# Patient Record
Sex: MALE | Race: WHITE
[De-identification: names, ages, dates, MRNs, and addresses within clinical notes are randomized per-mention and may not be internally consistent; named-entity substitution may affect disease eponyms.]

---

## 2019-09-13 ENCOUNTER — HOSPITAL ENCOUNTER (OUTPATIENT)
Dept: HOSPITAL 97 - OR | Age: 84
Discharge: HOME | End: 2019-09-13
Attending: OTOLARYNGOLOGY
Payer: COMMERCIAL

## 2019-09-13 VITALS — TEMPERATURE: 98.5 F | SYSTOLIC BLOOD PRESSURE: 129 MMHG | DIASTOLIC BLOOD PRESSURE: 70 MMHG

## 2019-09-13 VITALS — OXYGEN SATURATION: 95 %

## 2019-09-13 DIAGNOSIS — C44.329: Primary | ICD-10-CM

## 2019-09-13 DIAGNOSIS — Z85.46: ICD-10-CM

## 2019-09-13 DIAGNOSIS — I10: ICD-10-CM

## 2019-09-13 DIAGNOSIS — Z90.49: ICD-10-CM

## 2019-09-13 DIAGNOSIS — E07.9: ICD-10-CM

## 2019-09-13 DIAGNOSIS — Z82.3: ICD-10-CM

## 2019-09-13 PROCEDURE — 0HB1XZZ EXCISION OF FACE SKIN, EXTERNAL APPROACH: ICD-10-PCS

## 2019-09-13 PROCEDURE — 93005 ELECTROCARDIOGRAM TRACING: CPT

## 2019-09-13 PROCEDURE — 88331 PATH CONSLTJ SURG 1 BLK 1SPC: CPT

## 2019-09-13 PROCEDURE — 88332 PATH CONSLTJ SURG EA ADD BLK: CPT

## 2019-09-13 PROCEDURE — 0HQ1XZZ REPAIR FACE SKIN, EXTERNAL APPROACH: ICD-10-PCS

## 2019-09-13 PROCEDURE — 88305 TISSUE EXAM BY PATHOLOGIST: CPT

## 2019-09-13 PROCEDURE — 11644 EXC F/E/E/N/L MAL+MRG 3.1-4: CPT

## 2019-09-13 PROCEDURE — 12052 INTMD RPR FACE/MM 2.6-5.0 CM: CPT

## 2019-09-13 NOTE — P.BOP
Preoperative diagnosis: basosquamous carcinoma, R cheek


Postoperative diagnosis: same


Primary procedure: excision w/ FS and layered closure


Assistant: NONE,NONE


Estimated blood loss: <5ml


Specimen: R cheek, suture 12 oclock


Findings: negative margins.  Defect 32 x 21 mm


Anesthesia: General


Complications: None


Implants: none


Fluids & blood products: crystalloid 650ml


Transferred to: Recovery Room


Condition: Good

## 2019-09-13 NOTE — XMS REPORT
Encounter Summary

 Created on:2019



Patient:Latoya Glover

Sex:Male

:1933

External Reference #:[id number]





Demographics







 Address  [address] [place] [address]

 

 Home Phone  [phone]

 

 Preferred Language  English

 

 Marital Status  

 

 Presybeterian Affiliation  Unknown

 

 Race  White

 

 Ethnic Group  Not  or 









Author







Care Team Providers







 Name  Role  Phone

 

 Sanchez Pineda MD  Primary Care Provider  [phone]









Reason for Visit







 Follow Up Visit







Instructions







         1. Hypercholesterolemia

 

              lipid panel, serum

 

         2. Memory impairment

 

         3. Hypothyroidism

 

              TSH, serum or plasma

 

         4. Benign hypertension

 

              CMP, serum or plasma

 

              CBC w/ auto diff

 

         5. Chest pain

 

              chest pain: care instructions

 

              cardiology referral



Discussion Note: None recorded.



Plan of Care







 Reminders      Provider



       

 

      Appointments            None recorded.              



       

 

      Lab            Lipid Panel,         [place]



   Serum  2019  [facility] (Lab)

 

                  TSH, Serum or         [place]



   Plasma  2019  [facility] (Lab)

 

                  CMP, Serum or         [place]



   Plasma  2019  [facility] (Lab)

 

                  CBC W/ Auto         [place]



   Diff  2019  [facility] (Lab)

 

      Referral            Cardiology         Ulises Bob MD



   Referral  2019  



       

 

      Procedures            None recorded.              



       

 

      Surgeries            None recorded.              



       

 

      Imaging            None recorded.              



       







Medications







 Name  Start Date  









 allopurinol 300 mg tablet  



  TAKE 1 TABLET BY MOUTH ONCE DAILY  

 

 amlodipine 5 mg tablet  



  TAKE 1 TABLET(S) EVERY DAY BY ORAL ROUTE.  

 

 Aricept 10 mg tablet  



  Take 1 tablet every day by oral route.  

 

 B-12  



  1000 mcg  

 

 levothyroxine 75 mcg tablet  



  TAKE 1 TABLET BY MOUTH ONCE DAILY  

 

 lisinopril 20 mg-hydrochlorothiazide 25 mg tablet  



  TAKE 1 TABLET BY MOUTH EVERY DAY  

 

 meloxicam 15 mg tablet  



  TAKE 1 TABLET BY MOUTH ONCE DAILY  

 

 memantine 10 mg tablet  



  Take 1 tablet every day by oral route.  



  take twice daily for memory  

 

 mupirocin 2 % topical cream  



  APPLY A SMALL AMOUNT TO THE AFFECTED AREA BY TOPICAL ROUTE 3 TIMES PER DAY 
FOR 10 DAYS  

 

 niacin 500 mg tablet  2018



  Take 1 tablet every day by oral route.  

 

 Tylenol-Codeine #3 300 mg-30 mg tablet  



  Take 1 tablet 3 times a day by oral route.  

 

 Viagra 25 mg tablet  



  Take 1 tablet every day by oral route.  







Medications Administered

 None recorded.



Vitals







 Height  Weight  BMI  Blood Pressure

 

  66 in   211 lbs 16 oz   34.2 kg/m2   142/83 mm[Hg]







Lab Results

None recorded.



Allergies







 Code  Code System  Name  Reaction  Severity  Status  Onset



             









 NKDA        







Problems







 Name  Status  Onset Date  Source  



         









 Influenza Vaccination  Active  01/10/2018  

 

 Strain of Neck Muscle  Active  2018  

 

 Neck Pain  Active  2018  

 

 Chronic Neck Pain  Active  2018  

 

 Systolic Murmur  Active  2018  

 

 Vascular Dementia  Active  2018  

 

 Chest Pain  Active  2019  

 

 Hypothyroidism  Active    Encounter

 

 Hypercholesterolemia  Active    Encounter

 

 Gout  Active    Encounter

 

 Impotence  Active    Encounter

 

 Benign Hypertension  Active    Encounter

 

 Dental Caries  Active    Encounter

 

 Dental Abscess  Active    Encounter

 

 Osteoarthritis of Knee  Active    Encounter

 

 Dizziness Present  Active    Encounter

 

 Memory Impairment  Active    Encounter

 

 Unexplained Weight Loss  Active    Encounter

 

 Serum Creatinine Raised  Active    Encounter







Procedures







 Date  Name  Performed by  



       









   Appendectomy  Information not available

 

   Cholecystectomy  Information not available

 

   Tonsillectomy  Information not available







Vaccine List







 Vaccine Type

 

 influenza, high dose seasonal

 

     20170.5 mL

 

     01/10/80130.5 mL

 

     20180.5 mL

 

 pneumococcal conjugate PCV 13

 

     20160.5 mL







Social History







 Smoking Status  Former Smoker  







Past Encounters







 2019



 Hypercholesterolemia; Memory Impairment; Hypothyroidism; Benign Hypertension; 
Chest Pain



 Sanchez Pineda MD: [address] [place] [address], [address] [address], Ph. [phone]
[phone]

 

 2018



 Abrasion And/or Friction Burn of Skin; Memory Impairment; Vascular Dementia



 Sanchez Pineda MD: [address] [place] [address], [address], [place] [address], Ph. [phone]
[phone]







History of Present Illness

Note:    CC htnb&lt;div&gt;hpi doing well&lt;/div&gt;&lt;div&gt;CC dementia&lt;/
div&gt;&lt;div&gt;hpi doing better now mind seems clearer, driving around town&
lt;/div&gt;&lt;div&gt;CC CP &lt;/div&gt;&lt;div&gt;hpi had 3 or 4 episodes of 
CP when he went to bed stated was like pressure on the left side took 
ibuprophen and relieved pain&lt;/div&gt;&lt;div&gt;ros&lt;/div&gt;&lt;div&gt;
gen doing well&lt;/div&gt;&lt;div&gt;cv above&lt;/div&gt;&lt;div&gt;resp neg&lt;
/div&gt;&lt;div&gt;gi neg&lt;/div&gt;Review of Systems:    ROS as noted in the 
HPI



Review of Systems

None recorded.



Physical Exam







   Dr. Pineda Brief Adult Exam - M/F

 

 Reported By:  Patient

 

 Constitutional:  General Appearance: well-developed, obese. Level of Distress: 
NAD.



   Ambulation: ambulating normally

 

 Lungs:  Auscultation: breath sounds normal, good air movement, CTA except as



   noted, no wheezing, no rales/crackles, no rhonchi

 

 Cardiovascular:  Heart Auscultation: RRR, murmur, LISBET. Neck vessels: no 
carotid bruits

## 2019-09-13 NOTE — XMS REPORT
Encounter Summary

 Created on:2018



Patient:Latoya Glover

Sex:Male

:1933

External Reference #:[id number]





Demographics







 Address  [address] [place] [address]

 

 Home Phone  [phone]

 

 Preferred Language  English

 

 Marital Status  

 

 Sikh Affiliation  Unknown

 

 Race  White

 

 Ethnic Group  Not  or 









Author







Care Team Providers







 Name  Role  Phone

 

 Sanchez Pineda MD  Primary Care Provider  [phone]









Reason for Visit







 Follow Up Visit







Instructions







         1. Abrasion and/or friction burn of skin

 

         2. Memory impairment

 

         3. Vascular dementia



Discussion Note: None recorded.Patient educational handouts: No information 
available.



Plan of Care







 Reminders      Provider



       

 

      Appointments            Follow up       2019       Sanchez Pineda MD



     9:15AM  

 

      Lab            None              



   recorded.    

 

      Referral            None              



   recorded.    

 

      Procedures            None              



   recorded.    

 

      Surgeries            None              



   recorded.    

 

      Imaging            None              



   recorded.    







Medications







 Name  Start Date  









 allopurinol 300 mg tablet  



  TAKE 1 TABLET BY MOUTH ONCE DAILY  

 

 amlodipine 5 mg tablet  



  TAKE 1 TABLET(S) EVERY DAY BY ORAL ROUTE.  

 

 Aricept 10 mg tablet  



  Take 1 tablet every day by oral route.  

 

 B-12  



  1000 mcg  

 

 levothyroxine 75 mcg tablet  



  TAKE 1 TABLET BY MOUTH ONCE DAILY  

 

 lisinopril 20 mg-hydrochlorothiazide 25 mg tablet  



  TAKE 1 TABLET BY MOUTH EVERY DAY  

 

 meloxicam 15 mg tablet  



  TAKE 1 TABLET BY MOUTH ONCE DAILY  

 

 memantine 10 mg tablet  



  Take 1 tablet every day by oral route.  



  take twice daily for memory  

 

 mupirocin 2 % topical cream  



  APPLY A SMALL AMOUNT TO THE AFFECTED AREA BY TOPICAL ROUTE 3 TIMES PER DAY 
FOR 10 DAYS  

 

 niacin 500 mg tablet  2018



  Take 1 tablet every day by oral route.  

 

 Tylenol-Codeine #3 300 mg-30 mg tablet  



  Take 1 tablet 3 times a day by oral route.  

 

 Viagra 25 mg tablet  



  Take 1 tablet every day by oral route.  







Medications Administered

 None recorded.



Vitals







 Height  Weight  BMI  Blood Pressure

 

  66 in   209 lbs   33.7 kg/m2   151/80 mm[Hg]







Lab Results

None recorded.



Allergies







 Code  Code System  Name  Reaction  Severity  Status  Onset



             









 NKDA        







Problems







 Name  Status  Onset Date  Source  



         









 Influenza Vaccination  Active  01/10/2018  

 

 Strain of Neck Muscle  Active  2018  

 

 Neck Pain  Active  2018  

 

 Chronic Neck Pain  Active  2018  

 

 Systolic Murmur  Active  2018  

 

 Vascular Dementia  Active  2018  

 

 Hypothyroidism  Active    Encounter

 

 Hypercholesterolemia  Active    Encounter

 

 Gout  Active    Encounter

 

 Impotence  Active    Encounter

 

 Benign Hypertension  Active    Encounter

 

 Dental Caries  Active    Encounter

 

 Dental Abscess  Active    Encounter

 

 Osteoarthritis of Knee  Active    Encounter

 

 Dizziness Present  Active    Encounter

 

 Memory Impairment  Active    Encounter

 

 Unexplained Weight Loss  Active    Encounter

 

 Serum Creatinine Raised  Active    Encounter







Procedures







 Date  Name  Performed by  



       









   Appendectomy  Information not available

 

   Cholecystectomy  Information not available

 

   Tonsillectomy  Information not available







Vaccine List







 Vaccine Type

 

 influenza, high dose seasonal

 

     20170.5 mL

 

     01/10/59725.5 mL

 

     20180.5 mL

 

 pneumococcal conjugate PCV 13

 

     20160.5 mL







Social History







 Smoking Status  Former Smoker  







Past Encounters







 2018



 Abrasion And/or Friction Burn of Skin; Memory Impairment; Vascular Dementia



 Sanchez Pineda MD: [address] [place] [address], [address], [place] [address], Ph. [phone]
[phone]







History of Present Illness

Note:    CC f/up to fall&lt;div&gt;hpi pt feel a wk ago in the shower, abrasion 
to the rt forearm&lt;/div&gt;&lt;div&gt;cc memory loss&lt;/div&gt;&lt;div&gt;
hpi cont to worsen not taking meds&lt;/div&gt;&lt;div&gt;ros&lt;/div&gt;&lt;div&
gt;gen wife states driving in town &lt;/div&gt;&lt;div&gt;cv bp up dueto not 
taking meds&lt;/div&gt;&lt;div&gt;m/s neg&lt;/div&gt;Review of Systems:    ROS 
as noted in the HPI



Review of Systems

None recorded.



Physical Exam







   Dr. Pineda Brief Adult Exam - M/F

 

 Reported By:  Patient

 

 Constitutional:  General Appearance: well-developed, obese. Level of Distress: 
NAD.



   Ambulation: limited ambulation

 

 Lungs:  Auscultation: breath sounds normal, good air movement, CTA except as



   noted, no wheezing, no rales/crackles, no rhonchi

 

 Cardiovascular:  Heart Auscultation: RRR, no gallops

 

 Skin:  Inspection and palpation: ; healing abrasion to rt forearm

## 2019-09-13 NOTE — XMS REPORT
Clinical Summary

 Created on:2019



Patient:Latoya Glover

Sex:Male

:1933

External Reference #:[id number]





Demographics







 Address  [address]



   [place] [address]

 

 Home Phone  [phone]

 

 Email Address  [email or web address]

 

 Email Address  [email or web address]

 

 Preferred Language  English

 

 Marital Status  

 

 Christian Affiliation  Unknown

 

 Race  White

 

 Ethnic Group  Not  or 









Author







 Organization  [facility]

 

 Address  [address] [place] [address]









Support







 Name  Relationship  Address  Phone

 

 Amelia Glover  Spouse  Unavailable  [phone]









Care Team Providers







 Name  Role  Phone

 

 Sanchez Pineda MD  Primary Care Provider  [phone]









Allergies

No Known Allergies



Medications







 Medication  Sig  Dispensed  Refills  Start Date  End Date  Status

 

 allopurinol (ZYLOPRIM)  Take 300 mg by    1  2018    Active



 300 MG tablet  mouth daily.          

 

 lisinopril-hydrochloroth  Take 1 tablet by    3  2018    Active



 iazide  mouth daily.          



 (PRINZIDE,ZESTORETIC)            



 20-25 mg per tablet            

 

 amLODIPine (NORVASC) 5  TAKE 1 TABLET(S)    2  2018    Active



 mg tablet  EVERY DAY BY          



   ORAL ROUTE.          

 

 levothyroxine  Take 75 mcg by    0      Active



 (SYNTHROID, LEVOXYL) 75  mouth every          



 mcg tablet  morning.          

 

 docosahexanoic acid/epa  Take by mouth.    0      Active



 (FISH OIL ORAL)            

 

 gluc orozco/omega-3/vitamin  Take by mouth.    0      Active



 E (GLUCOSAMINE-FISH OIL            



 ORAL)            

 

 niacin 500 MG tablet  Take 500 mg by    0      Active



   mouth daily with          



   breakfast.          

 

 donepezil (ARICEPT) 10  Take 10 mg by    0      Active



 MG tablet  mouth nightly.          









 Hospital, Clinic, or Other  Ordered Dose  Route  Frequency  Start Date  End 
Date  Status



 Facility Administered            



 Medication            

 

 aspirin (ECOTRIN) enteric  81 mg  oral  daily  2018    Active



 coated tablet 81            



 mgIndications: Cerebral            



 infarction, unspecified            



 mechanism (HCC)            







Active Problems







 Problem  Noted Date

 

 Mild cognitive impairment  2018

 

 Memory loss  2018

 

 Cerebral infarct  2018

 

 Cervicalgia  2018







Family History







 Relation  Name  Status  Comments

 

 Father      

 

 Mother      







Social History







 Tobacco Use  Types  Packs/Day  Years Used  Date

 

 Never Smoker        









 Smokeless Tobacco: Never Used      









 Alcohol Use  Drinks/Week  oz/Week  Comments

 

 No      









 Sex Assigned at Birth  Date Recorded

 

 Not on file  









 Job Start Date  Occupation  Industry

 

 Not on file  Not on file  Not on file









 Travel History  Travel Start  Travel End









 No recent travel history available.







Last Filed Vital Signs

Not on file



Plan of Treatment







 Health Maintenance  Due Date  Last Done  Comments

 

 SHINGLES VACCINES (#1)  12/15/1983    

 

 65+ PNEUMOCOCCAL VACCINE (1 of 2 - PCV13)  12/15/1998    

 

 INFLUENZA VACCINE  2019    







Results

Not on fileafter 2018



Insurance







 Payer  Benefit Plan /  Subscriber ID  Effective Dates  Phone  Address  Type



   Group          

 

 MEDICARE  [place]  xxxxxxxx[place]  
[place]



   AND B    t      

 

 AARP  AARP SUPPLEMENT  xxxxxxxxxx  2008-Present      Commercial









 Guarantor Name  Account Type  Relation to  Date of  Phone  Billing



     Patient  Birth    Address

 

 Latoya Glover SWETHA  Personal/Family  Self  1933  [phone]   [address]







         (Home)  [place]



           [address]







Advance Directives

For more information, please contact: [phone]





 Type  Date Recorded  Patient Representative  Explanation

 

 Advance Directives, Living Will and      



 Medical Power of

## 2019-09-13 NOTE — XMS REPORT
Encounter Summary

 Created on:April 10, 2019



Patient:Latoya Glover

Sex:Male

:1933

External Reference #:[id number]





Demographics







 Address  [address] [place] [address]

 

 Home Phone  [phone]

 

 Preferred Language  English

 

 Marital Status  

 

 Rastafari Affiliation  Unknown

 

 Race  White

 

 Ethnic Group  Not  or 









Author







Care Team Providers







 Name  Role  Phone

 

 Sanchez Pineda MD  Primary Care Provider  [phone]









Reason for Visit







 Follow Up Visit







Instructions







         1. Daytime somnolence

 

              sleep study referral

 

         2. Vascular dementia



Discussion Note: None recorded.Patient educational handouts: No information 
available.



Plan of Care







 Reminders      Provider



       

 

      Appointments            Return to       on or around       Sanchez Pineda MD



   Office  2019  

 

      Lab            None              



   recorded.    

 

      Referral            Sleep Study       2019       [facility]



       

 

      Procedures            None              



   recorded.    

 

      Surgeries            None              



   recorded.    

 

      Imaging            None              



   recorded.    







Medications







 Name  Start Date  









 allopurinol 300 mg tablet  



  TAKE 1 TABLET BY MOUTH ONCE DAILY  

 

 amlodipine 5 mg tablet  



  TAKE 1 TABLET(S) EVERY DAY BY ORAL ROUTE.  

 

 Aricept 10 mg tablet  



  Take 1 tablet every day by oral route.  

 

 B-12  



  1000 mcg  

 

 levothyroxine 75 mcg tablet  



  TAKE 1 TABLET BY MOUTH ONCE DAILY  

 

 lisinopril 20 mg-hydrochlorothiazide 25 mg tablet  



  TAKE 1 TABLET BY MOUTH EVERY DAY  

 

 meloxicam 15 mg tablet  



  TAKE 1 TABLET BY MOUTH ONCE DAILY  

 

 memantine 10 mg tablet  



  Take 1 tablet every day by oral route.  



  take twice daily for memory  

 

 mupirocin 2 % topical cream  



  APPLY A SMALL AMOUNT TO THE AFFECTED AREA BY TOPICAL ROUTE 3 TIMES PER DAY 
FOR 10 DAYS  

 

 niacin 500 mg tablet  2018



  Take 1 tablet every day by oral route.  

 

 Tylenol-Codeine #3 300 mg-30 mg tablet  



  Take 1 tablet 3 times a day by oral route.  

 

 Viagra 25 mg tablet  



  Take 1 tablet every day by oral route.  







Medications Administered

 None recorded.



Vitals







 Height  Weight  BMI  Blood Pressure

 

  66 in   210 lbs 16 oz   34.1 kg/m2   152/82 mm[Hg]







Lab Results

None recorded.



Allergies







 Code  Code System  Name  Reaction  Severity  Status  Onset



             









 NKDA        







Problems







 Name  Status  Onset Date  Source  



         









 Influenza Vaccination  Active  01/10/2018  

 

 Strain of Neck Muscle  Active  2018  

 

 Neck Pain  Active  2018  

 

 Chronic Neck Pain  Active  2018  

 

 Systolic Murmur  Active  2018  

 

 Vascular Dementia  Active  2018  

 

 Chest Pain  Active  2019  

 

 Daytime Somnolence  Active  04/10/2019  

 

 Hypothyroidism  Active    Encounter

 

 Hypercholesterolemia  Active    Encounter

 

 Gout  Active    Encounter

 

 Impotence  Active    Encounter

 

 Benign Hypertension  Active    Encounter

 

 Dental Caries  Active    Encounter

 

 Dental Abscess  Active    Encounter

 

 Osteoarthritis of Knee  Active    Encounter

 

 Dizziness Present  Active    Encounter

 

 Memory Impairment  Active    Encounter

 

 Unexplained Weight Loss  Active    Encounter

 

 Serum Creatinine Raised  Active    Encounter







Procedures







 Date  Name  Performed by  



       









   Appendectomy  Information not available

 

   Cholecystectomy  Information not available

 

   Tonsillectomy  Information not available







Vaccine List







 Vaccine Type

 

 influenza, high dose seasonal

 

     20170.5 mL

 

     01/10/85420.5 mL

 

     20180.5 mL

 

 pneumococcal conjugate PCV 13

 

     20160.5 mL







Social History







 Smoking Status  Former Smoker  







Past Encounters







 04/10/2019



 Daytime Somnolence; Vascular Dementia



 Sanchez Pineda MD: [address] [place] [address], [address], [place] [address], Ph. [phone]
[phone]







History of Present Illness

Note:    CC somnolence&lt;div&gt;hpi wife states sleeping most of the time 
during the day he states does not sleep at night&lt;/div&gt;&lt;div&gt;CC not 
taking meds&lt;/div&gt;&lt;div&gt;hpi states forgets to take them&lt;/div&gt;&lt
;div&gt;ros&lt;/div&gt;&lt;div&gt;gen above&lt;/div&gt;&lt;div&gt;cv neg&lt;/div
&gt;&lt;div&gt;resp neg&lt;/div&gt;&lt;div&gt;gi neg&lt;/div&gt;&lt;div&gt;
neuro dementia worsening&lt;/div&gt;Review of Systems:    ROS as noted in the 
HPI



Review of Systems

None recorded.



Physical Exam







   Dr. Pineda Brief Adult Exam - M/F

 

 Reported By:  Patient

 

 Constitutional:  General Appearance: well-developed, overweight. Level of 
Distress:



   NAD. Ambulation: ambulating normally

 

 Neck:  Neck: supple, trachea midline, no masses, FROM. Lymph Nodes: no



   cervical LAD, no supraclavicular LAD

 

 Lungs:  Auscultation: breath sounds normal, good air movement, CTA except as



   noted, no wheezing, no rales/crackles, no rhonchi

 

 Cardiovascular:  Heart Auscultation: tachycardia, murmur, LISBET

## 2019-09-14 NOTE — OP
Date of Procedure:  09/13/2019



Surgeon:  Madelaine Leon MD



Preoperative Diagnosis:  Basosquamous carcinoma.



Indication For Procedure:  Mr. Glover underwent an excisional biopsy of a right cheek lesion, which
 demonstrated a basosquamous carcinoma with peripheral margins, deep margin, and the 12 and 6 o'clock
 tips positive for tumor.  Due to the margins, a re-excision was recommended and due to the location 
and anticipated size of the defect, the procedure was performed in the operating room.



Description Of Procedure:  The patient was brought to the operating room.  He was placed under genera
l anesthesia via LMA.  The area around the right cheek, around the lesion was injected with 1% lidoca
ine with epinephrine.  A total of 7 mL was used.  The patient's face was cleaned with Betadine and dr
aped in a sterile fashion.  The lesion and prior surgical site and scar was identified and a gross 2-
3 mm margin around this was designed.  A Bovie electrocautery was used to incise full-thickness throu
gh the skin and the skin was elevated in the plane of the subcutaneous tissue.  A suture was placed a
t 12 o'clock on the deep aspect of the superior portion of the wound.  The fibers of the orbicularis 
oris were noted as the defect extended on to the lower portion of the right lateral lower eyelid.  Th
e specimen was sent to pathology for frozen section analysis.  While awaiting the margins, the surrou
nding tissues were undermined using Bovie electrocautery.  The peripheral and deep margins were noted
 to be negative on the frozen section and reconstructive options were considered due to the degree of
 patient's skin laxity.  The inferior medial aspect of the wound was advanced over the defect, which 
allowed for approximation with minimal tension on the lateral eyelid.  The defect was closed in a lay
ered fashion using 4-0 Vicryl.  A small Burow's triangle was resected superiorly along the edge of th
e tear trough and inferiorly towards the lateral inferior cheek.  The skin was then closed in a runni
ng fashion using a 5-0 fast-absorbing gut.  Bleeding was minimal.  Triple antibiotic and a gauze dres
sing were applied to the surgical site and the patient was returned to care of anesthesia for awakeni
ng extubation in the operating room, which proceeded without difficulty.



Complications:  None.



Disposition:  The patient will be discharged home later today in the care of his family and follow up
 with Dr. Leon in 10 days for evaluation of wound healing.





ANDREW/COREEN

DD:  09/13/2019 12:34:58Voice ID:  [id number]

DT:  09/14/2019 00:23:28Report ID:  [id number]

## 2019-09-16 NOTE — EKG
Test Date:    2019-09-12               Test Time:    10:29:17

Technician:   ALEKSANDRA                                    

                                                     

MEASUREMENT RESULTS:                                       

Intervals:                                           

Rate:         60                                     

NC:           222                                    

QRSD:         146                                    

QT:           488                                    

QTc:          488                                    

Axis:                                                

P:            35                                     

NC:           222                                    

QRS:          -26                                    

T:            -21                                    

                                                     

INTERPRETIVE STATEMENTS:                                       

                                                     

Sinus rhythm with 1st degree AV block

Possible Left atrial enlargement

Right bundle branch block

Abnormal ECG

Compared to ECG 06/22/2015 14:43:03

First degree AV block now present



Electronically Signed On 09-16-19 16:55:51 CDT by Umair Molina